# Patient Record
Sex: FEMALE | Race: WHITE | NOT HISPANIC OR LATINO | Employment: OTHER | URBAN - METROPOLITAN AREA
[De-identification: names, ages, dates, MRNs, and addresses within clinical notes are randomized per-mention and may not be internally consistent; named-entity substitution may affect disease eponyms.]

---

## 2017-02-08 ENCOUNTER — TELEPHONE (OUTPATIENT)
Dept: FAMILY MEDICINE | Facility: CLINIC | Age: 32
End: 2017-02-08

## 2017-02-08 RX ORDER — MELOXICAM 15 MG/1
15 TABLET ORAL DAILY
Qty: 30 TABLET | Refills: 0 | Status: SHIPPED | OUTPATIENT
Start: 2017-02-08

## 2017-02-08 NOTE — TELEPHONE ENCOUNTER
----- Message from Marielos Pinzon sent at 2/8/2017  9:45 AM CST -----  Refill: meloxicam (MOBIC) 15 MG tablet    Please send to [facility]. Thank you!

## 2017-02-10 ENCOUNTER — HOSPITAL ENCOUNTER (EMERGENCY)
Facility: HOSPITAL | Age: 32
Discharge: PSYCHIATRIC OR SUBSTANCE USE FACILITY | End: 2017-02-11
Attending: EMERGENCY MEDICINE | Admitting: EMERGENCY MEDICINE
Payer: MEDICAID

## 2017-02-10 VITALS
SYSTOLIC BLOOD PRESSURE: 140 MMHG | TEMPERATURE: 99 F | WEIGHT: 226.88 LBS | DIASTOLIC BLOOD PRESSURE: 86 MMHG | RESPIRATION RATE: 20 BRPM | OXYGEN SATURATION: 98 % | HEART RATE: 86 BPM | BODY MASS INDEX: 37.75 KG/M2

## 2017-02-10 DIAGNOSIS — F31.62 BIPOLAR DISORDER, CURRENT EPISODE MIXED, MODERATE: Primary | Chronic | ICD-10-CM

## 2017-02-10 LAB
ALBUMIN SERPL BCP-MCNC: 3.8 G/DL
ALP SERPL-CCNC: 60 U/L
ALT SERPL W/O P-5'-P-CCNC: 15 U/L
AMPHET+METHAMPHET UR QL: NEGATIVE
ANION GAP SERPL CALC-SCNC: 7 MMOL/L
APAP SERPL-MCNC: <3 UG/ML
AST SERPL-CCNC: 15 U/L
B-HCG UR QL: NEGATIVE
BACTERIA #/AREA URNS AUTO: ABNORMAL /HPF
BARBITURATES UR QL SCN>200 NG/ML: NEGATIVE
BASOPHILS # BLD AUTO: 0 K/UL
BASOPHILS NFR BLD: 0 %
BENZODIAZ UR QL SCN>200 NG/ML: NEGATIVE
BILIRUB SERPL-MCNC: 0.3 MG/DL
BILIRUB UR QL STRIP: NEGATIVE
BUN SERPL-MCNC: 6 MG/DL
BZE UR QL SCN: NEGATIVE
CALCIUM SERPL-MCNC: 9.3 MG/DL
CANNABINOIDS UR QL SCN: NEGATIVE
CHLORIDE SERPL-SCNC: 106 MMOL/L
CLARITY UR REFRACT.AUTO: ABNORMAL
CO2 SERPL-SCNC: 24 MMOL/L
COLOR UR AUTO: YELLOW
CREAT SERPL-MCNC: 0.7 MG/DL
CREAT UR-MCNC: 75 MG/DL
CTP QC/QA: YES
DIFFERENTIAL METHOD: ABNORMAL
EOSINOPHIL # BLD AUTO: 0 K/UL
EOSINOPHIL NFR BLD: 0 %
ERYTHROCYTE [DISTWIDTH] IN BLOOD BY AUTOMATED COUNT: 14.8 %
EST. GFR  (AFRICAN AMERICAN): >60 ML/MIN/1.73 M^2
EST. GFR  (NON AFRICAN AMERICAN): >60 ML/MIN/1.73 M^2
ETHANOL SERPL-MCNC: <10 MG/DL
GLUCOSE SERPL-MCNC: 94 MG/DL
GLUCOSE UR QL STRIP: NEGATIVE
HCT VFR BLD AUTO: 43 %
HGB BLD-MCNC: 14.5 G/DL
HGB UR QL STRIP: NEGATIVE
HYALINE CASTS UR QL AUTO: 4 /LPF
KETONES UR QL STRIP: NEGATIVE
LEUKOCYTE ESTERASE UR QL STRIP: ABNORMAL
LYMPHOCYTES # BLD AUTO: 2.6 K/UL
LYMPHOCYTES NFR BLD: 24.8 %
MCH RBC QN AUTO: 26.6 PG
MCHC RBC AUTO-ENTMCNC: 33.7 %
MCV RBC AUTO: 79 FL
METHADONE UR QL SCN>300 NG/ML: NEGATIVE
MICROSCOPIC COMMENT: ABNORMAL
MONOCYTES # BLD AUTO: 0.8 K/UL
MONOCYTES NFR BLD: 7.9 %
NEUTROPHILS # BLD AUTO: 6.9 K/UL
NEUTROPHILS NFR BLD: 67 %
NITRITE UR QL STRIP: POSITIVE
OPIATES UR QL SCN: NEGATIVE
PCP UR QL SCN>25 NG/ML: NEGATIVE
PH UR STRIP: 6 [PH] (ref 5–8)
PLATELET # BLD AUTO: 229 K/UL
PMV BLD AUTO: 11.6 FL
POTASSIUM SERPL-SCNC: 3.8 MMOL/L
PROT SERPL-MCNC: 7.2 G/DL
PROT UR QL STRIP: NEGATIVE
RBC # BLD AUTO: 5.46 M/UL
SODIUM SERPL-SCNC: 137 MMOL/L
SP GR UR STRIP: 1 (ref 1–1.03)
SQUAMOUS #/AREA URNS AUTO: 2 /HPF
TOXICOLOGY INFORMATION: NORMAL
TSH SERPL DL<=0.005 MIU/L-ACNC: 2.2 UIU/ML
URN SPEC COLLECT METH UR: ABNORMAL
UROBILINOGEN UR STRIP-ACNC: NEGATIVE EU/DL
WBC # BLD AUTO: 10.36 K/UL
WBC #/AREA URNS AUTO: 19 /HPF (ref 0–5)

## 2017-02-10 PROCEDURE — 99285 EMERGENCY DEPT VISIT HI MDM: CPT | Mod: ,,, | Performed by: EMERGENCY MEDICINE

## 2017-02-10 PROCEDURE — 93010 ELECTROCARDIOGRAM REPORT: CPT | Mod: ,,, | Performed by: INTERNAL MEDICINE

## 2017-02-10 PROCEDURE — 87086 URINE CULTURE/COLONY COUNT: CPT

## 2017-02-10 PROCEDURE — 87088 URINE BACTERIA CULTURE: CPT

## 2017-02-10 PROCEDURE — 80320 DRUG SCREEN QUANTALCOHOLS: CPT

## 2017-02-10 PROCEDURE — 85025 COMPLETE CBC W/AUTO DIFF WBC: CPT

## 2017-02-10 PROCEDURE — 93005 ELECTROCARDIOGRAM TRACING: CPT

## 2017-02-10 PROCEDURE — 80053 COMPREHEN METABOLIC PANEL: CPT

## 2017-02-10 PROCEDURE — 99285 EMERGENCY DEPT VISIT HI MDM: CPT | Mod: 25

## 2017-02-10 PROCEDURE — 80329 ANALGESICS NON-OPIOID 1 OR 2: CPT

## 2017-02-10 PROCEDURE — 81001 URINALYSIS AUTO W/SCOPE: CPT

## 2017-02-10 PROCEDURE — 81025 URINE PREGNANCY TEST: CPT | Performed by: EMERGENCY MEDICINE

## 2017-02-10 PROCEDURE — 82570 ASSAY OF URINE CREATININE: CPT

## 2017-02-10 PROCEDURE — 84443 ASSAY THYROID STIM HORMONE: CPT

## 2017-02-10 PROCEDURE — 87077 CULTURE AEROBIC IDENTIFY: CPT

## 2017-02-10 PROCEDURE — 25000003 PHARM REV CODE 250: Performed by: EMERGENCY MEDICINE

## 2017-02-10 PROCEDURE — 87186 SC STD MICRODIL/AGAR DIL: CPT

## 2017-02-10 RX ORDER — DIPHENHYDRAMINE HYDROCHLORIDE 50 MG/ML
50 INJECTION INTRAMUSCULAR; INTRAVENOUS EVERY 4 HOURS PRN
Status: DISCONTINUED | OUTPATIENT
Start: 2017-02-10 | End: 2017-02-11 | Stop reason: HOSPADM

## 2017-02-10 RX ORDER — LORAZEPAM 2 MG/ML
2 INJECTION INTRAMUSCULAR EVERY 4 HOURS PRN
Status: DISCONTINUED | OUTPATIENT
Start: 2017-02-10 | End: 2017-02-11 | Stop reason: HOSPADM

## 2017-02-10 RX ORDER — NITROFURANTOIN 25; 75 MG/1; MG/1
100 CAPSULE ORAL
Status: COMPLETED | OUTPATIENT
Start: 2017-02-10 | End: 2017-02-10

## 2017-02-10 RX ORDER — HALOPERIDOL 5 MG/ML
5 INJECTION INTRAMUSCULAR EVERY 4 HOURS PRN
Status: DISCONTINUED | OUTPATIENT
Start: 2017-02-10 | End: 2017-02-11 | Stop reason: HOSPADM

## 2017-02-10 RX ADMIN — NITROFURANTOIN (MONOHYDRATE/MACROCRYSTALS) 100 MG: 75; 25 CAPSULE ORAL at 08:02

## 2017-02-11 NOTE — ED NOTES
"Patient states she has a mental health  from [facility] that accompanies her on most doctor visits who she spoke with today and pt reports he was trying to get her into a facility next week.      Contact information: "Nurse Desai" [phone]  "

## 2017-02-11 NOTE — ED NOTES
Patient identifiers verified and correct for Hailey Edmond.    LOC: The patient is awake, alert and oriented x 4. Pt is speaking appropriately, no slurred speech.  APPEARANCE: Patient resting comfortably and in no acute distress. Pt is disheveled. No JVD visible. Pt reports pain level of 0.  SKIN: Skin is warm dry and intact, and color is consistent with ethnicity. No tenting observed and capillary refill <3 seconds. No clubbing noted to nail beds. No breakdown or brusing visible and mucus membranes moist and acyanotic.  Pt has numerous healed scars on bilateral arms, reportedly from self-cutting.  MUSCULOSKELETAL: Full range of motion present in all extremities. Hand  equal and leg strength strong +2 bilaterally.  RESPIRATORY: Airway is open and patent. Respirations-unlabored, regular rate, equal bilaterally on inspiration and expiration. No accessory muscle use noted. Lungs clear to auscultation in all fields bilaterally anterior and posterior.   CARDIAC: Patient has regular heart rate and rhythm.  No peripheral edema noted, and patient has no c/o chest pain.  ABDOMEN: Soft and non-tender to palpation with no distention noted. Normoactive bowel sounds X4 quadrants. Pt has no complaints of abnormal bowel movements. Pt reports normal appetite.   NEUROLOGIC: Eyes open spontaneously and facial expression symmetrical. Pt behavior appropriate to situation, and pt follows commands.  Pt reports sensation present in all extremities when touched with a finger. No s/s of ischemic stroke. PERRLA  : No complaints of frequency, burning, urgency or blood in the urine.

## 2017-02-11 NOTE — ED NOTES
PEC received in [facility] will look for placement when medically cleared.

## 2017-02-11 NOTE — ED NOTES
PEC faxed to the following facilities;  [facility], [facility], [facility], [facility]

## 2017-02-11 NOTE — ED PROVIDER NOTES
"Encounter Date: 2/10/2017    SCRIBE #1 NOTE: I, Lemueltwyla Canela, am scribing for, and in the presence of, Gopi Pace MD.       History     Chief Complaint   Patient presents with    Mental Health Problem     Brought in by [facility] officers with [facility]. Denies SI or HI. Reports hasnt slept in 3 days. hx of mental health, taking meds, arguing with grandmother recently.     Review of patient's allergies indicates:   Allergen Reactions    Penicillins Itching     HPI Comments: Time seen by provider: 6:39 PM    This is a 31 y.o. female with history of bipolar disorder, last admitted from here to Psych 1 year ago due to SI, here for evaluation of several days of increased stress, decreased sleep, decreased appetite, self injury with cutting to her left forearm. Pt states she was told to go to ED by her  earlier but got into an argument and went home instead because it was "too late in the day to do anything," so her grandmother called the crisis hotline who apparently felt that pt was not having a crisis. Pt says that further family called [facility] (at around 2 PM) who brought pt to the hospital. Pt states she has been compliant with all her medications. Pt feels that she does not want to return home at this time in order to avoid further dispute with her family, and is concerned that she will become violent if she does. Pt denies SI, HI, internal stimuli, manic or bipolar episode, recent cough or cold, difficulty urinating or dysuria. She denies EtOH use since March 2016 as well as recent drug use, but she reports continued smoking.     The history is provided by the patient and medical records.     Past Medical History   Diagnosis Date    Anxiety     Asthma     Back pain, chronic     Bipolar disorder     Borderline personality disorder 1/21/2015    Depression     Full dentures     General weakness 5/13/2016    GERD (gastroesophageal reflux disease)     History of psychiatric care     History of psychiatric " hospitalization     Hx of psychiatric care     Hypertension     Lumbago     Psychiatric problem     Self-harming behavior     Suicide attempt     Therapy      Past Medical History Pertinent Negatives   Diagnosis Date Noted    ADHD (attention deficit hyperactivity disorder) 1/21/2015    Behavioral problem 1/21/2015    CHF (congestive heart failure) 1/21/2015    COPD (chronic obstructive pulmonary disease) 1/21/2015    CVA (cerebral vascular accident) 1/21/2015    Dementia 1/21/2015    Headache(784.0) 1/21/2015    HIV infection 1/21/2015    Liver disease 1/21/2015    Anastasiya 1/21/2015    Neuropathy 1/21/2015    Obsessive-compulsive disorder 1/21/2015    Oppositional defiant disorder 1/21/2015    Psychiatric exam requested by authority 1/21/2015    Psychosis 1/21/2015    PTSD (post-traumatic stress disorder) 1/21/2015    Renal dialysis status(V45.11) 1/21/2015    Renal disorder 1/21/2015    Schizoaffective disorder 1/21/2015    Seizures 1/21/2015    Thyroid disease 1/21/2015     Past Surgical History   Procedure Laterality Date    Appendectomy      Breast surgery       Breast reduction    Lithotripsy       numerous episodes 2004 in [place]     Family History   Problem Relation Age of Onset    Asthma Mother     COPD Mother     Drug abuse Mother     Lung cancer Mother     Colon cancer Mother     Hypertension Maternal Grandmother     Heart disease Maternal Grandmother      Social History   Substance Use Topics    Smoking status: Current Every Day Smoker     Packs/day: 1.00     Years: 15.00     Types: Cigarettes    Smokeless tobacco: Not on file    Alcohol use 0.0 oz/week     0 Standard drinks or equivalent per week     Review of Systems   Constitutional: Positive for appetite change. Negative for fever.   HENT: Negative for sore throat.    Respiratory: Negative for shortness of breath.    Cardiovascular: Negative for chest pain.   Gastrointestinal: Positive for constipation, nausea  and vomiting.   Genitourinary: Negative for dysuria.   Musculoskeletal: Negative for back pain.   Skin: Negative for rash.   Neurological: Negative for weakness.   Hematological: Does not bruise/bleed easily.   Psychiatric/Behavioral: Positive for self-injury and sleep disturbance. Negative for suicidal ideas.       Physical Exam   Initial Vitals   BP Pulse Resp Temp SpO2   02/10/17 1639 02/10/17 1639 02/10/17 1639 02/10/17 1639 02/10/17 1639   140/109 99 18 98.7 °F (37.1 °C) 98 %     Physical Exam    Nursing note and vitals reviewed.  Constitutional: No distress.   HENT:   Mouth/Throat: Mucous membranes are dry. No oropharyngeal exudate.   Neck: Normal range of motion. Neck supple.   Cardiovascular: Normal rate, regular rhythm and normal heart sounds.   No murmur heard.  Pulmonary/Chest: Breath sounds normal. She has no wheezes. She has no rhonchi. She has no rales.   Abdominal: Soft. There is no tenderness. There is no rebound and no guarding.   Musculoskeletal: She exhibits no edema.   Neurological: She is alert and oriented to person, place, and time. She has normal strength. No cranial nerve deficit or sensory deficit.   Skin: Abrasion noted.   Superficial abrasions to left forearm.    Psychiatric: She exhibits a depressed mood.   She has good insight.         ED Course   Procedures  Labs Reviewed   CBC W/ AUTO DIFFERENTIAL - Abnormal; Notable for the following:        Result Value    RBC 5.46 (*)     MCV 79 (*)     MCH 26.6 (*)     RDW 14.8 (*)     All other components within normal limits   COMPREHENSIVE METABOLIC PANEL - Abnormal; Notable for the following:     Anion Gap 7 (*)     All other components within normal limits   URINALYSIS - Abnormal; Notable for the following:     Appearance, UA Hazy (*)     Nitrite, UA Positive (*)     Leukocytes, UA 3+ (*)     All other components within normal limits    Narrative:     1 cup of urine   ACETAMINOPHEN LEVEL - Abnormal; Notable for the following:      Acetaminophen (Tylenol), Serum <3.0 (*)     All other components within normal limits   URINALYSIS MICROSCOPIC - Abnormal; Notable for the following:     WBC, UA 19 (*)     Bacteria, UA Many (*)     Hyaline Casts, UA 4 (*)     All other components within normal limits    Narrative:     1 cup of urine   CULTURE, URINE   CULTURE, URINE   TSH   DRUG SCREEN PANEL, URINE EMERGENCY    Narrative:     1 cup of urine   ALCOHOL,MEDICAL (ETHANOL)   POCT URINE PREGNANCY     EKG Readings: (Independently Interpreted)   Normal sinus at 81, isolated inverted t-waves, unchanged from previous.             Medical Decision Making:   History:   Old Medical Records: I decided to obtain old medical records.  Old Records Summarized: records from previous admission(s).  Initial Assessment:       31 y.o. female with history of bipolar sent with [facility] after her  noted self-injury and aggression towards family at home. Per [facility], pt has not eaten or slept in days with cutting behavior concerning for SI, and she has been overmedicating on her psychiatric prescriptions. She appears midlly dehydrated on exam due to decreased PO intake, but no sign of asthma attack or other acute medical issues. We will check basic labs to medically clear and EKG given reported overmedication of psych meds.  Pt denies current SI but agrees that she requires psych hospitialization to prevent worsening. We will place PEC and consult Psych.    7:53 PM    Labs reviewed with no acute process except nitrite positive UTI. Pt with no current urninary symptoms but states she gets frequent UTI. We will send urine culture and start macrobid empirically. Her meds and potential drug interactions were reviewed and no concerns for starting macrobid. Medically cleared for psych evaluation and  Admission.    Independently Interpreted Test(s):   I have ordered and independently interpreted EKG Reading(s) - see prior notes  Clinical Tests:   Lab Tests: Reviewed and  Ordered  Medical Tests: Reviewed and Ordered  Other:   I have discussed this case with another health care provider.       <> Summary of the Discussion: Psychiatry            Scribe Attestation:   Scribe #1: I performed the above scribed service and the documentation accurately describes the services I performed. I attest to the accuracy of the note.    Attending Attestation:           Physician Attestation for Scribe:  Physician Attestation Statement for Scribe #1: I, Gopi Pace MD, reviewed documentation, as scribed by Lemuel Canela in my presence, and it is both accurate and complete.                 ED Course     Clinical Impression:   The encounter diagnosis was Bipolar disorder, current episode mixed, moderate.    Disposition:   Disposition: Admitted       Gopi Pace MD  02/11/17 1640

## 2017-02-12 LAB — BACTERIA UR CULT: NORMAL

## 2017-02-17 DIAGNOSIS — I10 ESSENTIAL HYPERTENSION: Chronic | ICD-10-CM

## 2017-02-17 DIAGNOSIS — F41.9 ANXIETY: ICD-10-CM

## 2017-02-17 RX ORDER — PROPRANOLOL HYDROCHLORIDE 10 MG/1
10 TABLET ORAL 3 TIMES DAILY
Qty: 90 TABLET | Refills: 0 | Status: SHIPPED | OUTPATIENT
Start: 2017-02-17

## 2017-02-17 NOTE — TELEPHONE ENCOUNTER
----- Message from Latoya Young sent at 2/17/2017 11:21 AM CST -----  Contact: [facility]   Refill request for Propranolol 10 mg  # [phone]     LL